# Patient Record
Sex: FEMALE | Race: WHITE | HISPANIC OR LATINO | Employment: PART TIME | URBAN - METROPOLITAN AREA
[De-identification: names, ages, dates, MRNs, and addresses within clinical notes are randomized per-mention and may not be internally consistent; named-entity substitution may affect disease eponyms.]

---

## 2023-11-16 ENCOUNTER — LAB REQUISITION (OUTPATIENT)
Dept: LAB | Facility: CLINIC | Age: 23
End: 2023-11-16

## 2023-11-22 LAB
PATH REPORT.COMMENTS IMP SPEC: NORMAL
PATH REPORT.FINAL DX SPEC: NORMAL
PATH REPORT.GROSS SPEC: NORMAL
PATH REPORT.MICROSCOPIC SPEC OTHER STN: NORMAL
PATH REPORT.RELEVANT HX SPEC: NORMAL
PATH REPORT.RELEVANT HX SPEC: NORMAL
PATH REPORT.SITE OF ORIGIN SPEC: NORMAL

## 2023-12-13 ENCOUNTER — TRANSCRIBE ORDERS (OUTPATIENT)
Dept: OTHER | Age: 23
End: 2023-12-13

## 2023-12-13 ENCOUNTER — PATIENT OUTREACH (OUTPATIENT)
Dept: ONCOLOGY | Facility: CLINIC | Age: 23
End: 2023-12-13
Payer: MEDICAID

## 2023-12-13 DIAGNOSIS — N85.02 EIN (ENDOMETRIAL INTRAEPITHELIAL NEOPLASIA): Primary | ICD-10-CM

## 2023-12-13 NOTE — PROGRESS NOTES
New Patient Hematology / Oncology Nurse Navigator Note     Referral Date: 12/13/23    Referring provider:   Екатерина Atkins MD   [id number] [place] [address]   Phone: [phone]     Referring Clinic/Organization: [facility]      Referred to: GynOnc    Requested provider (if applicable): Dr. Holland   (pt requests [place] so will offer any [place] provider [place] [facility] [place])    Evaluation for : EIN (endometrial intraepithelial neoplasia)       Clinical History (per Nurse review of records provided):     Office Visit yesterday with [facility]-- BOOKMARKED  11/10 Op Note: Hysteroscopy D&C, PAP-- BOOKMARKED  11/16 addended Path:  Final Diagnosis   THIS REPORT WAS AMENDED TO CORRECT THE PATIENT'S DEMOGRAPHICS.  THE ORIGINAL DIAGNOSIS REMAINS UNCHANGED.  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  Pt confirms they are aware of the referral and ready to schedule  Provided contact information if future questions arise.     -Transferred pt to NPS line [phone] to schedule appt per scheduling instructions.      Jelly Allen, GWENN, RN, PHN, OCN  Hematology/Oncology Nurse Navigator  [facility]  [phone]

## 2023-12-20 NOTE — TELEPHONE ENCOUNTER
RECORDS STATUS - ALL OTHER DIAGNOSIS      Imaging Received  December 20, 2023 12:17 PM    Action: [place] [facility] [place].     RECORDS RECEIVED FROM: [facility]   DATE RECEIVED:    NOTES STATUS DETAILS   OFFICE NOTE from referring provider [facility] 12/12/23: Dr. Екатерина Atkins   OPERATIVE REPORT [facility] 11/10/23: HYSTEROSCOPY    MEDICATION LIST [facility]    LABS     PATHOLOGY REPORTS Report in [facility] Path Consult:  11/16/23: [id number]     Surg Path:  11/10/23: [id number]    ANYTHING RELATED TO DIAGNOSIS [facility] Most recent 12/12/23   IMAGING (NEED IMAGES & REPORT)     CT SCANS ([place] [facility] 11/8/20: CT Abd Pel   ULTRASOUND ([place] [facility] 10/16/23: US Pel

## 2023-12-27 ENCOUNTER — PRE VISIT (OUTPATIENT)
Dept: ONCOLOGY | Facility: CLINIC | Age: 23
End: 2023-12-27
Payer: MEDICAID

## 2023-12-31 ENCOUNTER — HEALTH MAINTENANCE LETTER (OUTPATIENT)
Age: 23
End: 2023-12-31

## 2024-01-29 ENCOUNTER — APPOINTMENT (OUTPATIENT)
Dept: INTERPRETER SERVICES | Facility: CLINIC | Age: 24
End: 2024-01-29
Payer: MEDICAID

## 2024-01-29 NOTE — PROGRESS NOTES
Per chart review, patient canceled 3 appointments with GynOnc and no-showed visit on 1/25 with Dr. Church.     Attempted to reach patient to discuss missed appointment/barriers to scheduling. VM full, unable to leave message. Will try again later.

## 2024-02-05 NOTE — PROGRESS NOTES
GYNECOLOGIC  ONCOLOGY CONSULT    Referring provider:    Екатерина Atkins  [address] [place] [id number]   RE: Vicky Martinez  : 2000  SOHA: 25     CC: EIN    HPI: Ms Vicky Martinez is a 24 year old G0 female who presents for consultation regarding new diagnosis of EIN. She is here today alone.    She presented with history of oligomenorrhea and on 10/16/2023 presented to [place] ED with 1-2 weeks of heavy vaginal bleeding and exertional dyspnea, Hg of 5.4 and transfused.     Work up to date    10/16/23 Pelvic US  Findings:   Uterus: measures 6.86 x 3.07 x 4.6 cm.  5 x 2 x 2 mm anechoic collection at the right uterine fundus.   Endometrial stripe:  1.46 cm with endometrial hyperemia and heterogeneous appearance.   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 She has some days in the morning when she has spotting, but it is very little compared to last time. She hasn't had any need for a pad since her IUD was placed. She denies other systemic symptoms, eating well, voiding and having regular bowel movements. No concerns today    OBGYN history and Health Maintenance:    Last Pap Smear: 2023 NIL - this was her first pap smear    Not currently sexually active  Desires future fertility    Past Medical History:   Diagnosis Date    Anemia due to blood loss, acute        Past Surgical History:   Procedure Laterality Date    D & C  2023          Current Outpatient Medications   Medication Sig Dispense Refill    acetaminophen (TYLENOL) 325 MG tablet Take 2 tablets by mouth every 4 hours as needed      ferrous gluconate (FERGON) 324 (38 Fe) MG tablet Take 324 mg by mouth   "    ibuprofen (ADVIL/MOTRIN) 600 MG tablet Take 600 mg by mouth      senna (SENOKOT) 8.6 MG tablet Take 8.6 mg by mouth           Allergies   Allergen Reactions    Shellfish-Derived Products Nausea and Vomiting     Lobster       Social History:  Social History     Tobacco Use    Smoking status: Not on file    Smokeless tobacco: Not on file   Substance Use Topics    Alcohol use: Not on file     She is a  for work. She lives alone. She has an uncle in town, but otherwise no family in town.    Family History:     Patient does not know of any family history of cancer, specifically no known breast, uterine, colon, ovarian cancer.     Physical Exam:     /60   Pulse 67   Temp 98  F (36.7  C) (Oral)   Resp 16   Ht 1.59 m (5' 2.6\")   Wt 64 kg (141 lb 3.2 oz)   SpO2 99%   BMI 25.33 kg/m    Body mass index is 25.33 kg/m .    General: Alert and oriented, no acute distress  Psych: Mood stable  Cardiovascular: RRR, no murmors  Pulmonary: Lungs clear . Normal respiratory effort  GI: No distention. No masses. No hernia.   Lymph: No enlarge lymph nodes in neck or groin  : Normal external genitalia. Cervix small with IUD string visible. Uterus midline, no adnexal mass.    Lab:    Hemoglobin 10.3 (12/12/2023)  10/2023 Normal Prolactin, Testosterone, TSH, FSH;,HgA1C 5.1      Assessment:  Vicky Martinez is a 23 year old woman with a new diagnosis of EIN. She is s/p IUD placement on 11/16/2023. Desires future fertility.     I have reviewed outside records, imaging and pathology and reviewed them with patient. In addition we discussed diagnosis and recommended treatment options with the use of , all of her questions were answered to her stated satisfaction.      Plan:     1.)    EIN - Likely secondary to oligomenorrhea. Discussed need for follow up and repeat sampling with conservative management of EIN. Will plan for repeat pelvic US and endometrial sampling in 6 months. Advised patient to call " clinic or return to [facility] if patient has worsening bleeding or additional symptoms.     2.) Genetic risk factors were assessed: Does not meet criteria at this time    3.) Labs and/or tests ordered include:  TVUS and clinic visit w/ EMBx in 6mo.           Diana Church M.D., MPH,  F.A.C.O.G.  Professor  Department of Ob/Gyn and Women's Health  Division of Gynecologic Oncology  [facility] [place]  [phone]      Time: total time spent today, 60 , including preparation, review of outside records, face to face counseling, and documentation.

## 2024-02-08 ENCOUNTER — ONCOLOGY VISIT (OUTPATIENT)
Dept: ONCOLOGY | Facility: CLINIC | Age: 24
End: 2024-02-08
Attending: OBSTETRICS & GYNECOLOGY
Payer: MEDICAID

## 2024-02-08 VITALS
WEIGHT: 141.2 LBS | TEMPERATURE: 98 F | OXYGEN SATURATION: 99 % | RESPIRATION RATE: 16 BRPM | HEART RATE: 67 BPM | SYSTOLIC BLOOD PRESSURE: 103 MMHG | HEIGHT: 63 IN | DIASTOLIC BLOOD PRESSURE: 60 MMHG | BODY MASS INDEX: 25.02 KG/M2

## 2024-02-08 DIAGNOSIS — N85.02 EIN (ENDOMETRIAL INTRAEPITHELIAL NEOPLASIA): Primary | ICD-10-CM

## 2024-02-08 PROCEDURE — 99205 OFFICE O/P NEW HI 60 MIN: CPT | Performed by: OBSTETRICS & GYNECOLOGY

## 2024-02-08 PROCEDURE — G0463 HOSPITAL OUTPT CLINIC VISIT: HCPCS | Performed by: OBSTETRICS & GYNECOLOGY

## 2024-02-08 RX ORDER — ACETAMINOPHEN 325 MG/1
2 TABLET ORAL EVERY 4 HOURS PRN
COMMUNITY
Start: 2023-11-10

## 2024-02-08 RX ORDER — SENNOSIDES A AND B 8.6 MG/1
8.6 TABLET, FILM COATED ORAL
COMMUNITY
Start: 2023-10-18

## 2024-02-08 RX ORDER — FERROUS GLUCONATE 324(38)MG
324 TABLET ORAL
COMMUNITY
Start: 2023-12-13

## 2024-02-08 RX ORDER — IBUPROFEN 600 MG/1
600 TABLET, FILM COATED ORAL
COMMUNITY
Start: 2023-10-18

## 2024-02-08 ASSESSMENT — PAIN SCALES - GENERAL: PAINLEVEL: NO PAIN (0)

## 2024-02-08 NOTE — PATIENT INSTRUCTIONS
Plan:  In 6 months   US and in person visit with Dr Church (Same day)    Scheduling will reach out and assist with these appointments     Have a beautiful day     [place] [phone]

## 2024-02-08 NOTE — NURSING NOTE
"Oncology Rooming Note    February 8, 2024 10:22 AM   Vicky Martinez is a 24 year old female who presents for:    Chief Complaint   Patient presents with    Oncology Clinic Visit     New Eval for Endometrial Intraepithelial neoplasia     Initial Vitals: /60   Pulse 67   Temp 98  F (36.7  C) (Oral)   Resp 16   Ht 1.59 m (5' 2.6\")   Wt 64 kg (141 lb 3.2 oz)   SpO2 99%   BMI 25.33 kg/m   Estimated body mass index is 25.33 kg/m  as calculated from the following:    Height as of this encounter: 1.59 m (5' 2.6\").    Weight as of this encounter: 64 kg (141 lb 3.2 oz). Body surface area is 1.68 meters squared.  No Pain (0) Comment: Data Unavailable   No LMP recorded.  Allergies reviewed: Yes  Medications reviewed: Yes    Medications: Medication refills not needed today.  Pharmacy name entered into [employer]: [id number] - [place], [place] - [address] AT [address] [facility] [address] [facility]    Frailty Screening:   Is the patient here for a new oncology consult visit in cancer care? 2. No      Clinical concerns: none       Saranya El MA             "

## 2024-02-08 NOTE — LETTER
2024         RE: Vicky Martinez  [address]  [facility] [address]        Dear Colleague,    Thank you for referring your patient, Vicky Martinez, to the [facility]. Please see a copy of my visit note below.    GYNECOLOGIC  ONCOLOGY CONSULT    Referring provider:    Екатерина Atkins  [address] [place] [address]   RE: Vicky Martinez  : 2000  SOHA: 25     CC: EIN    HPI: Ms Vicky Martinez is a 24 year old G0 female who presents for consultation regarding new diagnosis of EIN. She is here today alone.    She presented with history of oligomenorrhea and on 10/16/2023 presented to [place] ED with 1-2 weeks of heavy vaginal bleeding and exertional dyspnea, Hg of 5.4 and transfused.     Work up to date    10/16/23 Pelvic US  Findings:   Uterus: measures 6.86 x 3.07 x 4.6 cm.  5 x 2 x 2 mm anechoic collection at the right uterine fundus.   Endometrial stripe:  1.46 cm with endometrial hyperemia and heterogeneous appearance.   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 She has some days in the morning when she has spotting, but it is very little compared to last time. She hasn't had any need for a pad since her IUD was placed. She denies other systemic symptoms, eating well, voiding and having regular bowel movements. No concerns today    OBGYN history and Health Maintenance:    Last Pap Smear: 2023 NIL - this was her first pap smear    Not currently sexually active  Desires future fertility    Past Medical History:   Diagnosis Date     Anemia due to blood loss, acute        Past Surgical History:   Procedure Laterality  "Date     D & C  11/2023          Current Outpatient Medications   Medication Sig Dispense Refill     acetaminophen (TYLENOL) 325 MG tablet Take 2 tablets by mouth every 4 hours as needed       ferrous gluconate (FERGON) 324 (38 Fe) MG tablet Take 324 mg by mouth       ibuprofen (ADVIL/MOTRIN) 600 MG tablet Take 600 mg by mouth       senna (SENOKOT) 8.6 MG tablet Take 8.6 mg by mouth           Allergies   Allergen Reactions     Shellfish-Derived Products Nausea and Vomiting     Lobster       Social History:  Social History     Tobacco Use     Smoking status: Not on file     Smokeless tobacco: Not on file   Substance Use Topics     Alcohol use: Not on file     She is a  for work. She lives alone. She has an uncle in town, but otherwise no family in town.    Family History:     Patient does not know of any family history of cancer, specifically no known breast, uterine, colon, ovarian cancer.     Physical Exam:     /60   Pulse 67   Temp 98  F (36.7  C) (Oral)   Resp 16   Ht 1.59 m (5' 2.6\")   Wt 64 kg (141 lb 3.2 oz)   SpO2 99%   BMI 25.33 kg/m    Body mass index is 25.33 kg/m .    General: Alert and oriented, no acute distress  Psych: Mood stable  Cardiovascular: RRR, no murmors  Pulmonary: Lungs clear . Normal respiratory effort  GI: No distention. No masses. No hernia.   Lymph: No enlarge lymph nodes in neck or groin  : Normal external genitalia. Cervix small with IUD string visible. Uterus midline, no adnexal mass.    Lab:    Hemoglobin 10.3 (12/12/2023)  10/2023 Normal Prolactin, Testosterone, TSH, FSH;,HgA1C 5.1      Assessment:  Vicky Martinez is a 23 year old woman with a new diagnosis of EIN. She is s/p IUD placement on 11/16/2023. Desires future fertility.     I have reviewed outside records, imaging and pathology and reviewed them with patient. In addition we discussed diagnosis and recommended treatment options with the use of , all of her questions were answered " to her stated satisfaction.      Plan:     1.)    EIN - Likely secondary to oligomenorrhea. Discussed need for follow up and repeat sampling with conservative management of EIN. Will plan for repeat pelvic US and endometrial sampling in 6 months. Advised patient to call clinic or return to [facility] if patient has worsening bleeding or additional symptoms.     2.) Genetic risk factors were assessed: Does not meet criteria at this time    3.) Labs and/or tests ordered include:  TVUS and clinic visit w/ EMBx in 6mo.           Diana Church M.D., MPH,  F.A.C.O.G.  Professor  Department of Ob/Gyn and Women's Health  Division of Gynecologic Oncology  [facility] [place]  [phone]      Time: total time spent today, 60 , including preparation, review of outside records, face to face counseling, and documentation.          Again, thank you for allowing me to participate in the care of your patient.        Sincerely,        Diana Church MD

## 2024-08-28 ENCOUNTER — APPOINTMENT (OUTPATIENT)
Dept: INTERPRETER SERVICES | Facility: CLINIC | Age: 24
End: 2024-08-28

## 2024-09-17 ENCOUNTER — APPOINTMENT (OUTPATIENT)
Dept: INTERPRETER SERVICES | Facility: CLINIC | Age: 24
End: 2024-09-17

## 2024-09-19 DIAGNOSIS — N85.02 EIN (ENDOMETRIAL INTRAEPITHELIAL NEOPLASIA): Primary | ICD-10-CM

## 2024-09-24 ENCOUNTER — APPOINTMENT (OUTPATIENT)
Dept: INTERPRETER SERVICES | Facility: CLINIC | Age: 24
End: 2024-09-24

## 2024-09-24 ENCOUNTER — PATIENT OUTREACH (OUTPATIENT)
Dept: CARE COORDINATION | Facility: CLINIC | Age: 24
End: 2024-09-24

## 2024-09-24 NOTE — PROGRESS NOTES
Social Work - Telephone/MyChart message  [facility]  Data:   Patient Name: Vicky Martinez  Goes By: Vicky CHAVEZ/Age: 2000 (24 year old)      Referral Source: care team    Reason for Referral: requested to speak  with     Intervention: Sent patient MyChart message on 2024 and called patient's number but was unable to leave a message due to mail box being full. SW called patient's significant other's number and provided SW's contact number, S.O. will let patient know Sw called and provided patient with SW's number .   Plan:  will await patient's return phone call/message and provide assistance at that time.   Patient returned call, SW called back with . Patient reports they were asked if they have insurance to which they replied no, they were then asked if they wanted to speak with a .   SW asked patient if they have had any insurance in the past. Patient stated no. SW asked patient if they have the option of getting insurance through their employer, they stated no. SW discussed insurance options and [place] [facility] as a resource to explore their insurance options and apply for insurance.     [facility], patient will follow up with resource.   RUPA aLw, [facility]    [facility]  [address] [place] [address]  Office: [phone]  Fax: [phone]  Gender Pronouns: She/Her  Employed by [facility]  Support Groups at [facility]: Social Work Services for Cancer Patients ([email or web address])  Employed by [employer]

## 2024-10-30 NOTE — PROGRESS NOTES
GYNECOLOGIC  ONCOLOGY CLINIC NOTE    Referring provider:    Екатерина Atkins  [address] [place] [address]   RE: Vicky Martinez  : 2000  SOHA: 10/31/24     CC: JEANETTE    HPI: Ms Vicky Martinez is a 24 year old G0 female who presents for active management follow up.      Today she presents alone and we used a  for the visit.    Work up to date    She presented with history of oligomenorrhea and on 10/16/2023 presented to [facility] ED with 1-2 weeks of heavy vaginal bleeding and exertional dyspnea, Hg of 5.4 and transfused.   10/16/23 Pelvic US  Findings:   Uterus: measures 6.86 x 3.07 x 4.6 cm.  5 x 2 x 2 mm anechoic collection at the right uterine fundus.   Endometrial stripe:  1.46 cm with endometrial hyperemia and heterogeneous appearance.   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 She has regular cycles no spotting. No abnormal discharge. She was not able to attend the Pelvic US today.     OBGYN history and Health Maintenance:    Last Pap Smear: 2023 NIL - this was her first pap smear    Not currently sexually active  Desires future fertility    Past Medical History:   Diagnosis Date    Anemia due to blood loss, acute        Past Surgical History:   Procedure Laterality Date    D & C  2023          Current Outpatient Medications   Medication Sig Dispense Refill    acetaminophen (TYLENOL) 325 MG tablet Take 2 tablets by mouth every 4 hours as needed      ferrous gluconate (FERGON) 324 (38 Fe) MG tablet Take 324 mg by mouth      ibuprofen (ADVIL/MOTRIN) 600 MG tablet Take 600 mg by mouth      senna  (SENOKOT) 8.6 MG tablet Take 8.6 mg by mouth           Allergies   Allergen Reactions    Shellfish-Derived Products Nausea and Vomiting     Lobster       Social History:  Social History     Tobacco Use    Smoking status: Not on file    Smokeless tobacco: Not on file   Substance Use Topics    Alcohol use: Not on file     She is a  for work. She lives alone. She has an uncle in town, but otherwise no family in town.    Family History:     Patient does not know of any family history of cancer, specifically no known breast, uterine, colon, ovarian cancer.     Physical Exam:     /70 (BP Location: Right arm, Patient Position: Right side, Cuff Size: Adult Regular)   Pulse 63   Temp 98.1  F (36.7  C) (Oral)   Resp 16   Wt 65 kg (143 lb 3.2 oz)   SpO2 98%   BMI 25.69 kg/m    Body mass index is 25.69 kg/m .    General: Alert and oriented, no acute distress  Psych: Mood stable  GI: No distention. No masses. No hernia.   Lymph: No enlarge lymph nodes in groin  : Normal external genitalia. 1 cm right labial cyst. Cervix small with IUD string visible. Uterus anteverted.    Endometrial Biopsy was performed as follows: A speculum placed in   vagina with good visualization of cervix; cervix swabbed x3 with   Betadine solution. Anterior lip of cervix grasped with single   toothed tenaculum; endometrial sampling achieved with Pipelle   sampling unit. Tissue collected, labelled and sent to Pathology.   Instruments removed, hemostasis achieved with ease. The patient   tolerated the procedure well.    Moderate about of tissue obtained sent to pathology    Assessment:  Vicky Martinez is a 24 year old woman with a new diagnosis of EIN. She is s/p IUD placement on 11/16/2023. Desires future fertility.     Endometrial Biopsy obtained today      Plan:     1.)    EIN - Likely secondary to oligomenorrhea. Currently using Mirena IUD for conservative management of EIN.     Follow up in 2-3 weeks to discuss biopsy results.  She has some symptoms with IUD and wants to discuss continuing on it or alternative treatment.     2.) Genetic risk factors were assessed: Does not meet criteria at this time    3.) Labs and/or tests ordered include:  will obtain US if biopsy is concerning.         Diana Church M.D., MPH,  F.A.C.O.G.  Professor  Department of Ob/Gyn and Women's Health  Division of Gynecologic Oncology  [facility] [place]  [phone]    Time: total time spent today, 25 , including preparation, review of outside records, face to face counseling, and documentation.

## 2024-10-31 ENCOUNTER — ONCOLOGY VISIT (OUTPATIENT)
Dept: ONCOLOGY | Facility: CLINIC | Age: 24
End: 2024-10-31
Attending: OBSTETRICS & GYNECOLOGY

## 2024-10-31 VITALS
HEART RATE: 63 BPM | DIASTOLIC BLOOD PRESSURE: 70 MMHG | SYSTOLIC BLOOD PRESSURE: 115 MMHG | WEIGHT: 143.2 LBS | RESPIRATION RATE: 16 BRPM | TEMPERATURE: 98.1 F | OXYGEN SATURATION: 98 % | BODY MASS INDEX: 25.69 KG/M2

## 2024-10-31 DIAGNOSIS — N85.02 EIN (ENDOMETRIAL INTRAEPITHELIAL NEOPLASIA): Primary | ICD-10-CM

## 2024-10-31 PROCEDURE — T1013 SIGN LANG/ORAL INTERPRETER: HCPCS

## 2024-10-31 PROCEDURE — 88305 TISSUE EXAM BY PATHOLOGIST: CPT | Mod: 26 | Performed by: PATHOLOGY

## 2024-10-31 PROCEDURE — 99213 OFFICE O/P EST LOW 20 MIN: CPT | Performed by: OBSTETRICS & GYNECOLOGY

## 2024-10-31 PROCEDURE — 88305 TISSUE EXAM BY PATHOLOGIST: CPT | Mod: TC | Performed by: OBSTETRICS & GYNECOLOGY

## 2024-10-31 ASSESSMENT — PAIN SCALES - GENERAL: PAINLEVEL_OUTOF10: NO PAIN (0)

## 2024-10-31 NOTE — NURSING NOTE
"Oncology Rooming Note    October 31, 2024 8:36 AM   Vicky Martinez is a 24 year old female who presents for:    Chief Complaint   Patient presents with    Oncology Clinic Visit     RTN for Endometrial Cancer     Initial Vitals: /70 (BP Location: Right arm, Patient Position: Right side, Cuff Size: Adult Regular)   Pulse 63   Temp 98.1  F (36.7  C) (Oral)   Resp 16   Wt 65 kg (143 lb 3.2 oz)   SpO2 98%   BMI 25.69 kg/m   Estimated body mass index is 25.69 kg/m  as calculated from the following:    Height as of 2/8/24: 1.59 m (5' 2.6\").    Weight as of this encounter: 65 kg (143 lb 3.2 oz). Body surface area is 1.69 meters squared.  No Pain (0) Comment: Data Unavailable   No LMP recorded.  Allergies reviewed: Yes  Medications reviewed: Yes    Medications: Medication refills not needed today.  Pharmacy name entered into [employer]: [id number] - [facility] [address] AT [address] [place] [address]    Frailty Screening:   Is the patient here for a new oncology consult visit in cancer care? 2. No      Clinical concerns: none        Saranya El MA             "

## 2024-10-31 NOTE — LETTER
10/31/2024      Vicky Martinez  [address]  [facility] [address]      Dear Colleague,    Thank you for referring your patient, Vicky Martinez, to the [facility]. Please see a copy of my visit note below.    GYNECOLOGIC  ONCOLOGY CLINIC NOTE    Referring provider:    Екатерина Atkins  [address] [place] [address]   RE: Vicky Martinez  : 2000  SOHA: 10/31/24     CC: EIN    HPI: Ms Vicky Martinez is a 24 year old G0 female who presents for active management follow up.      Today she presents alone and we used a  for the visit.    Work up to date    She presented with history of oligomenorrhea and on 10/16/2023 presented to [facility] ED with 1-2 weeks of heavy vaginal bleeding and exertional dyspnea, Hg of 5.4 and transfused.   10/16/23 Pelvic US  Findings:   Uterus: measures 6.86 x 3.07 x 4.6 cm.  5 x 2 x 2 mm anechoic collection at the right uterine fundus.   Endometrial stripe:  1.46 cm with endometrial hyperemia and heterogeneous appearance.   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 She has regular cycles no spotting. No abnormal discharge. She was not able to attend the Pelvic US today.     OBGYN history and Health Maintenance:    Last Pap Smear: 2023 NIL - this was her first pap smear    Not currently sexually active  Desires future fertility    Past Medical History:   Diagnosis Date     Anemia due to blood loss, acute        Past Surgical History:   Procedure Laterality Date     D & C  2023          Current Outpatient Medications   Medication Sig Dispense  Refill     acetaminophen (TYLENOL) 325 MG tablet Take 2 tablets by mouth every 4 hours as needed       ferrous gluconate (FERGON) 324 (38 Fe) MG tablet Take 324 mg by mouth       ibuprofen (ADVIL/MOTRIN) 600 MG tablet Take 600 mg by mouth       senna (SENOKOT) 8.6 MG tablet Take 8.6 mg by mouth           Allergies   Allergen Reactions     Shellfish-Derived Products Nausea and Vomiting     Lobster       Social History:  Social History     Tobacco Use     Smoking status: Not on file     Smokeless tobacco: Not on file   Substance Use Topics     Alcohol use: Not on file     She is a  for work. She lives alone. She has an uncle in town, but otherwise no family in town.    Family History:     Patient does not know of any family history of cancer, specifically no known breast, uterine, colon, ovarian cancer.     Physical Exam:     /70 (BP Location: Right arm, Patient Position: Right side, Cuff Size: Adult Regular)   Pulse 63   Temp 98.1  F (36.7  C) (Oral)   Resp 16   Wt 65 kg (143 lb 3.2 oz)   SpO2 98%   BMI 25.69 kg/m    Body mass index is 25.69 kg/m .    General: Alert and oriented, no acute distress  Psych: Mood stable  GI: No distention. No masses. No hernia.   Lymph: No enlarge lymph nodes in groin  : Normal external genitalia. 1 cm right labial cyst. Cervix small with IUD string visible. Uterus anteverted.    Endometrial Biopsy was performed as follows: A speculum placed in   vagina with good visualization of cervix; cervix swabbed x3 with   Betadine solution. Anterior lip of cervix grasped with single   toothed tenaculum; endometrial sampling achieved with Pipelle   sampling unit. Tissue collected, labelled and sent to Pathology.   Instruments removed, hemostasis achieved with ease. The patient   tolerated the procedure well.    Moderate about of tissue obtained sent to pathology    Assessment:  Vicky Martinez is a 24 year old woman with a new diagnosis of EIN. She is s/p IUD placement on  11/16/2023. Desires future fertility.     Endometrial Biopsy obtained today      Plan:     1.)    EIN - Likely secondary to oligomenorrhea. Currently using Mirena IUD for conservative management of EIN.     Follow up in 2-3 weeks to discuss biopsy results. She has some symptoms with IUD and wants to discuss continuing on it or alternative treatment.     2.) Genetic risk factors were assessed: Does not meet criteria at this time    3.) Labs and/or tests ordered include:  will obtain US if biopsy is concerning.         Diana Church M.D., MPH,  F.A.C.O.G.  Professor  Department of Ob/Gyn and Women's Health  Division of Gynecologic Oncology  [facility]  [phone]    Time: total time spent today, 25 , including preparation, review of outside records, face to face counseling, and documentation.          Again, thank you for allowing me to participate in the care of your patient.        Sincerely,        Diana Church MD

## 2024-11-05 LAB
PATH REPORT.COMMENTS IMP SPEC: NORMAL
PATH REPORT.COMMENTS IMP SPEC: NORMAL
PATH REPORT.FINAL DX SPEC: NORMAL
PATH REPORT.GROSS SPEC: NORMAL
PATH REPORT.MICROSCOPIC SPEC OTHER STN: NORMAL
PATH REPORT.RELEVANT HX SPEC: NORMAL
PHOTO IMAGE: NORMAL

## 2024-11-18 ENCOUNTER — VIRTUAL VISIT (OUTPATIENT)
Dept: ONCOLOGY | Facility: CLINIC | Age: 24
End: 2024-11-18
Attending: OBSTETRICS & GYNECOLOGY

## 2024-11-18 DIAGNOSIS — N85.02 EIN (ENDOMETRIAL INTRAEPITHELIAL NEOPLASIA): Primary | ICD-10-CM

## 2024-11-18 PROCEDURE — 99213 OFFICE O/P EST LOW 20 MIN: CPT | Mod: 95 | Performed by: OBSTETRICS & GYNECOLOGY

## 2024-11-18 ASSESSMENT — PAIN SCALES - GENERAL: PAINLEVEL_OUTOF10: NO PAIN (0)

## 2024-11-18 NOTE — NURSING NOTE
Current patient location: [address] [place] [id number]    Is the patient currently in the state of [place]? YES    Visit mode:VIDEO    If the visit is dropped, the patient can be reconnected by:VIDEO VISIT: Text to cell phone:   Telephone Information:   Mobile [phone]       Will anyone else be joining the visit? NO  (If patient encounters technical issues they should call [phone]    Are changes needed to the allergy or medication list? No    Are refills needed on medications prescribed by this physician? NO    Rooming Documentation:  Questionnaire(s) completed    Reason for visit: GENEVA GALINDO

## 2024-11-18 NOTE — LETTER
2024      Vicky Martinez  [address] [facility] [address]      Dear Colleague,    Thank you for referring your patient, Vicky Martinez, to the [facility]. Please see a copy of my visit note below.    Virtual Visit Details    Type of service:  Video Visit       Originating Location (pt. Location): Home    Distant Location (provider location):  On-site  Platform used for Video Visit: [facility]    GYNECOLOGIC  ONCOLOGY CLINIC NOTE    Referring provider:    Екатерина Atkins  [address] [address]   RE: Vicky Martinez  : 2000  SOHA: 2024      CC: EIN    HPI: Ms Vicky Martinez is a 24 year old G0 female who presents for active management follow up.      Today she presents alone and we used a  for the visit.     Work up to date    She presented with history of oligomenorrhea and on 10/16/2023 presented to [facility] ED with 1-2 weeks of heavy vaginal bleeding and exertional dyspnea, Hg of 5.4 and transfused.   10/16/23 Pelvic US  Findings:   Uterus: measures 6.86 x 3.07 x 4.6 cm.  5 x 2 x 2 mm anechoic collection at the right uterine fundus.   Endometrial stripe:  1.46 cm with endometrial hyperemia and heterogeneous appearance.   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 Noted some vaginal spotting, no concern with IUD. Reports pain along lower abdomen  and wants to know if the IUD is the problem.    OBGYN history and Health Maintenance:    Last Pap Smear: 2023 NIL - this was her first pap smear    Not currently sexually active  Desires future fertility    Past Medical History:   Diagnosis Date     Anemia due to blood loss, acute        Past Surgical History:   Procedure Laterality Date     D & C  2023          Current Outpatient Medications   Medication Sig Dispense Refill     acetaminophen (TYLENOL) 325 MG tablet Take 2 tablets by mouth every 4 hours as needed       ferrous gluconate (FERGON) 324 (38 Fe) MG tablet Take 324 mg by mouth (Patient not taking: Reported on 2024)       ibuprofen (ADVIL/MOTRIN) 600 MG tablet Take 600 mg by mouth (Patient not taking: Reported on 2024)       senna (SENOKOT) 8.6 MG tablet Take 8.6 mg by mouth (Patient not taking: Reported on 2024)           Allergies   Allergen Reactions     Avocado Itching     Shellfish-Derived Products Nausea and Vomiting     Lobster       Social History:  Social History     Tobacco Use     Smoking status: Never     Smokeless tobacco: Never   Substance Use Topics     Alcohol use: Not on file     She is a  for work. She lives alone. She has an uncle in town, but otherwise no family in town.    Family History:     Patient does not know of any family history of cancer, specifically no known breast, uterine, colon, ovarian cancer.     Physical Exam:     There were no vitals taken for this visit.  There is no height or weight on file to calculate BMI.    General: Alert and oriented, no acute distress  Psych: Mood stable      Assessment:  Vicky Maritnez is a 24 year old woman with a new diagnosis of EIN. She is s/p IUD placement on 2023. Desires future fertility.     Pelvic Pain - has Mirena IUD in place    Pathology reviewed and appears to have resolution based on most recent biopsy      Plan:     1.)    EIN -  Currently using Mirena IUD for conservative management  with treatment response.    Follow up after Pelvic US to evaluate location and discuss her tolerance of Mirena IUD    2.) Genetic risk factors were assessed: Does not meet criteria at this time        Diana Church M.D., MPH,  F.A.C.O.G.  Professor  Department of Ob/Gyn and Women's Health  Division of Gynecologic Oncology  [facility]  [phone]    Time: total time spent today, 25 , including preparation, review of outside records, face to face counseling, and documentation.          Again, thank you for allowing me to participate in the care of your patient.        Sincerely,        Diana Church MD

## 2024-11-18 NOTE — PROGRESS NOTES
Virtual Visit Details    Type of service:  Video Visit       Originating Location (pt. Location): Home    Distant Location (provider location):  On-site  Platform used for Video Visit: [facility]    GYNECOLOGIC  ONCOLOGY CLINIC NOTE    Referring provider:    Екатерина Atkins  [address] [place] [id number]   RE: Vicky Martinez  : 2000  SOHA: 2024      CC: EIN    HPI: Ms Vicky Martinez is a 24 year old G0 female who presents for active management follow up.      Today she presents alone and we used a  for the visit.     Work up to date    She presented with history of oligomenorrhea and on 10/16/2023 presented to [place] [facility] ED with 1-2 weeks of heavy vaginal bleeding and exertional dyspnea, Hg of 5.4 and transfused.   10/16/23 Pelvic US  Findings:   Uterus: measures 6.86 x 3.07 x 4.6 cm.  5 x 2 x 2 mm anechoic collection at the right uterine fundus.   Endometrial stripe:  1.46 cm with endometrial hyperemia and heterogeneous appearance.   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 Noted some vaginal spotting, no concern with IUD. Reports pain along lower abdomen and wants to know if the IUD is the problem.    OBGYN history and Health Maintenance:    Last Pap Smear: 2023 NIL - this was her first pap smear    Not currently sexually active  Desires future fertility    Past Medical History:   Diagnosis  Date    Anemia due to blood loss, acute        Past Surgical History:   Procedure Laterality Date    D & C  11/2023          Current Outpatient Medications   Medication Sig Dispense Refill    acetaminophen (TYLENOL) 325 MG tablet Take 2 tablets by mouth every 4 hours as needed      ferrous gluconate (FERGON) 324 (38 Fe) MG tablet Take 324 mg by mouth (Patient not taking: Reported on 11/18/2024)      ibuprofen (ADVIL/MOTRIN) 600 MG tablet Take 600 mg by mouth (Patient not taking: Reported on 11/18/2024)      senna (SENOKOT) 8.6 MG tablet Take 8.6 mg by mouth (Patient not taking: Reported on 11/18/2024)           Allergies   Allergen Reactions    Avocado Itching    Shellfish-Derived Products Nausea and Vomiting     Lobster       Social History:  Social History     Tobacco Use    Smoking status: Never    Smokeless tobacco: Never   Substance Use Topics    Alcohol use: Not on file     She is a  for work. She lives alone. She has an uncle in town, but otherwise no family in town.    Family History:     Patient does not know of any family history of cancer, specifically no known breast, uterine, colon, ovarian cancer.     Physical Exam:     There were no vitals taken for this visit.  There is no height or weight on file to calculate BMI.    General: Alert and oriented, no acute distress  Psych: Mood stable      Assessment:  Vicky Martinez is a 24 year old woman with a new diagnosis of EIN. She is s/p IUD placement on 11/16/2023. Desires future fertility.     Pelvic Pain - has Mirena IUD in place    Pathology reviewed and appears to have resolution based on most recent biopsy      Plan:     1.)    EIN -  Currently using Mirena IUD for conservative management with treatment response.    Follow up after Pelvic US to evaluate location and discuss her tolerance of Mirena IUD    2.) Genetic risk factors were assessed: Does not meet criteria at this time        Diana Church M.D., MPH,  F.A.C.O.G.  Professor  Department  of Ob/Gyn and Women's Health  Division of Gynecologic Oncology  [facility]  [phone]    Time: total time spent today, 25 , including preparation, review of outside records, face to face counseling, and documentation.

## 2025-01-19 ENCOUNTER — HEALTH MAINTENANCE LETTER (OUTPATIENT)
Age: 25
End: 2025-01-19